# Patient Record
Sex: MALE | Race: WHITE | URBAN - METROPOLITAN AREA
[De-identification: names, ages, dates, MRNs, and addresses within clinical notes are randomized per-mention and may not be internally consistent; named-entity substitution may affect disease eponyms.]

---

## 2017-01-01 ENCOUNTER — TELEPHONE (OUTPATIENT)
Dept: FAMILY MEDICINE CLINIC | Facility: CLINIC | Age: 52
End: 2017-01-01

## 2017-07-17 RX ORDER — HYDROCHLOROTHIAZIDE 12.5 MG/1
CAPSULE, GELATIN COATED ORAL
Qty: 90 CAPSULE | Refills: 0 | Status: SHIPPED | OUTPATIENT
Start: 2017-07-17 | End: 2017-10-13

## 2017-07-17 RX ORDER — HYDROCHLOROTHIAZIDE 12.5 MG/1
1 CAPSULE, GELATIN COATED ORAL DAILY
COMMUNITY
Start: 2014-09-11 | End: 2017-09-21

## 2017-09-21 ENCOUNTER — OFFICE VISIT (OUTPATIENT)
Dept: FAMILY MEDICINE CLINIC | Facility: CLINIC | Age: 52
End: 2017-09-21

## 2017-09-21 ENCOUNTER — APPOINTMENT (OUTPATIENT)
Dept: LAB | Age: 52
End: 2017-09-21
Attending: FAMILY MEDICINE
Payer: COMMERCIAL

## 2017-09-21 VITALS
RESPIRATION RATE: 18 BRPM | WEIGHT: 271.38 LBS | HEART RATE: 70 BPM | TEMPERATURE: 97 F | SYSTOLIC BLOOD PRESSURE: 138 MMHG | BODY MASS INDEX: 34.83 KG/M2 | HEIGHT: 74 IN | DIASTOLIC BLOOD PRESSURE: 94 MMHG

## 2017-09-21 DIAGNOSIS — R97.20 ELEVATED PROSTATE SPECIFIC ANTIGEN (PSA): ICD-10-CM

## 2017-09-21 DIAGNOSIS — Z00.00 HEALTH CARE MAINTENANCE: ICD-10-CM

## 2017-09-21 DIAGNOSIS — M25.50 ARTHRALGIA, UNSPECIFIED JOINT: ICD-10-CM

## 2017-09-21 DIAGNOSIS — I10 ESSENTIAL HYPERTENSION: Primary | ICD-10-CM

## 2017-09-21 LAB
ALBUMIN SERPL-MCNC: 3.9 G/DL (ref 3.5–4.8)
ALP LIVER SERPL-CCNC: 92 U/L (ref 45–117)
ALT SERPL-CCNC: 41 U/L (ref 17–63)
AST SERPL-CCNC: 18 U/L (ref 15–41)
BASOPHILS # BLD AUTO: 0.04 X10(3) UL (ref 0–0.1)
BASOPHILS NFR BLD AUTO: 0.7 %
BILIRUB SERPL-MCNC: 0.6 MG/DL (ref 0.1–2)
BILIRUB UR QL STRIP.AUTO: NEGATIVE
BUN BLD-MCNC: 24 MG/DL (ref 8–20)
CALCIUM BLD-MCNC: 9.2 MG/DL (ref 8.3–10.3)
CHLORIDE: 108 MMOL/L (ref 101–111)
CHOLEST SMN-MCNC: 168 MG/DL (ref ?–200)
CO2: 24 MMOL/L (ref 22–32)
COLOR UR AUTO: YELLOW
CREAT BLD-MCNC: 0.99 MG/DL (ref 0.7–1.3)
EOSINOPHIL # BLD AUTO: 0.19 X10(3) UL (ref 0–0.3)
EOSINOPHIL NFR BLD AUTO: 3.1 %
ERYTHROCYTE [DISTWIDTH] IN BLOOD BY AUTOMATED COUNT: 13.5 % (ref 11.5–16)
GLUCOSE BLD-MCNC: 91 MG/DL (ref 70–99)
GLUCOSE UR STRIP.AUTO-MCNC: NEGATIVE MG/DL
HCT VFR BLD AUTO: 45 % (ref 37–53)
HDLC SERPL-MCNC: 50 MG/DL (ref 45–?)
HDLC SERPL: 3.36 {RATIO} (ref ?–4.97)
HGB BLD-MCNC: 15 G/DL (ref 13–17)
IMMATURE GRANULOCYTE COUNT: 0.02 X10(3) UL (ref 0–1)
IMMATURE GRANULOCYTE RATIO %: 0.3 %
KETONES UR STRIP.AUTO-MCNC: NEGATIVE MG/DL
LDLC SERPL CALC-MCNC: 100 MG/DL (ref ?–130)
LDLC SERPL-MCNC: 18 MG/DL (ref 5–40)
LEUKOCYTE ESTERASE UR QL STRIP.AUTO: NEGATIVE
LYMPHOCYTES # BLD AUTO: 1.51 X10(3) UL (ref 0.9–4)
LYMPHOCYTES NFR BLD AUTO: 24.6 %
M PROTEIN MFR SERPL ELPH: 7.6 G/DL (ref 6.1–8.3)
MCH RBC QN AUTO: 29.1 PG (ref 27–33.2)
MCHC RBC AUTO-ENTMCNC: 33.3 G/DL (ref 31–37)
MCV RBC AUTO: 87.2 FL (ref 80–99)
MONOCYTES # BLD AUTO: 0.51 X10(3) UL (ref 0.1–0.6)
MONOCYTES NFR BLD AUTO: 8.3 %
NEUTROPHIL ABS PRELIM: 3.88 X10 (3) UL (ref 1.3–6.7)
NEUTROPHILS # BLD AUTO: 3.88 X10(3) UL (ref 1.3–6.7)
NEUTROPHILS NFR BLD AUTO: 63 %
NITRITE UR QL STRIP.AUTO: NEGATIVE
NONHDLC SERPL-MCNC: 118 MG/DL (ref ?–130)
PH UR STRIP.AUTO: 5 [PH] (ref 4.5–8)
PLATELET # BLD AUTO: 220 10(3)UL (ref 150–450)
POTASSIUM SERPL-SCNC: 4 MMOL/L (ref 3.6–5.1)
PROT UR STRIP.AUTO-MCNC: NEGATIVE MG/DL
PSA SERPL-MCNC: 3.14 NG/ML (ref 0.01–4)
RBC # BLD AUTO: 5.16 X10(6)UL (ref 4.3–5.7)
RBC UR QL AUTO: NEGATIVE
RED CELL DISTRIBUTION WIDTH-SD: 43 FL (ref 35.1–46.3)
SODIUM SERPL-SCNC: 141 MMOL/L (ref 136–144)
SP GR UR STRIP.AUTO: 1.02 (ref 1–1.03)
TRIGLYCERIDES: 89 MG/DL (ref ?–150)
TSI SER-ACNC: 2.27 MIU/ML (ref 0.35–5.5)
UROBILINOGEN UR STRIP.AUTO-MCNC: <2 MG/DL
WBC # BLD AUTO: 6.2 X10(3) UL (ref 4–13)

## 2017-09-21 PROCEDURE — 81003 URINALYSIS AUTO W/O SCOPE: CPT | Performed by: FAMILY MEDICINE

## 2017-09-21 PROCEDURE — 87476 LYME DIS DNA AMP PROBE: CPT | Performed by: FAMILY MEDICINE

## 2017-09-21 PROCEDURE — 84153 ASSAY OF PSA TOTAL: CPT | Performed by: FAMILY MEDICINE

## 2017-09-21 PROCEDURE — 36415 COLL VENOUS BLD VENIPUNCTURE: CPT | Performed by: FAMILY MEDICINE

## 2017-09-21 PROCEDURE — 99213 OFFICE O/P EST LOW 20 MIN: CPT | Performed by: FAMILY MEDICINE

## 2017-09-21 PROCEDURE — 80061 LIPID PANEL: CPT | Performed by: FAMILY MEDICINE

## 2017-09-21 PROCEDURE — 80050 GENERAL HEALTH PANEL: CPT | Performed by: FAMILY MEDICINE

## 2017-09-21 RX ORDER — HYDROCODONE BITARTRATE AND ACETAMINOPHEN 5; 300 MG/1; MG/1
1 TABLET ORAL AS NEEDED
Refills: 0 | COMMUNITY
Start: 2017-07-24

## 2017-09-21 RX ORDER — CYCLOBENZAPRINE HCL 10 MG
10 TABLET ORAL 3 TIMES DAILY PRN
COMMUNITY
End: 2018-01-01

## 2017-09-21 RX ORDER — OMEPRAZOLE 20 MG/1
20 CAPSULE, DELAYED RELEASE ORAL
COMMUNITY
End: 2018-01-01

## 2017-09-21 RX ORDER — IBUPROFEN 600 MG/1
600 TABLET ORAL EVERY 6 HOURS PRN
COMMUNITY

## 2017-09-21 RX ORDER — LOSARTAN POTASSIUM 50 MG/1
50 TABLET ORAL DAILY
Qty: 30 TABLET | Refills: 0 | Status: SHIPPED | OUTPATIENT
Start: 2017-09-21 | End: 2017-10-13

## 2017-09-21 NOTE — PROGRESS NOTES
[address]  PROGRESS NOTE  Chief Complaint:   Patient presents with: Follow - Up: B/P      HPI:   This is a 46year old male coming in for recheck of blood pressure. Over the past month patient has noted his blood pressure be elevated. by mouth every morning before breakfast. Disp:  Rfl:    ibuprofen 600 MG Oral Tab Take 600 mg by mouth every 6 (six) hours as needed for Pain. Disp:  Rfl:    Losartan Potassium 50 MG Oral Tab Take 1 tablet (50 mg total) by mouth daily.  Disp: 30 tablet Rfl: LYME DISEASE(B.BURGDORFERI)PCR; Future  -     LYME DISEASE(B.BURGDORFERI)PCR    Elevated prostate specific antigen (PSA)  -     PSA; Future  -     PSA    Health care maintenance  -     CBC WITH DIFFERENTIAL WITH PLATELET;  Future  -     COMP METABOLIC PAN

## 2017-09-21 NOTE — PATIENT INSTRUCTIONS
Continue hydrochlorothiazide. Start to new blood pressure medication, losartan. Return for physical exam.  Labs drawn today.

## 2017-09-25 ENCOUNTER — TELEPHONE (OUTPATIENT)
Dept: FAMILY MEDICINE CLINIC | Facility: CLINIC | Age: 52
End: 2017-09-25

## 2017-09-25 LAB — BORRELIA SPECIES DNA DETECTION: NOT DETECTED

## 2017-09-25 NOTE — TELEPHONE ENCOUNTER
Informed pt of his blood work results. Pleased results of blood work up front for  along with last years blood work. Pt expressed understanding and thanks.

## 2017-09-25 NOTE — TELEPHONE ENCOUNTER
----- Message from Remigio Reina MD sent at 9/25/2017  8:28 AM CDT -----  Labs are completely normal including PSA of 3.1 (has a history of elevated PSA) and Lyme disease test which is negative.   Please notify patient

## 2017-10-13 ENCOUNTER — OFFICE VISIT (OUTPATIENT)
Dept: FAMILY MEDICINE CLINIC | Facility: CLINIC | Age: 52
End: 2017-10-13

## 2017-10-13 VITALS
DIASTOLIC BLOOD PRESSURE: 88 MMHG | TEMPERATURE: 98 F | HEIGHT: 74.5 IN | WEIGHT: 274.13 LBS | SYSTOLIC BLOOD PRESSURE: 140 MMHG | HEART RATE: 80 BPM | BODY MASS INDEX: 34.81 KG/M2

## 2017-10-13 DIAGNOSIS — Z00.00 HEALTH CARE MAINTENANCE: Primary | ICD-10-CM

## 2017-10-13 DIAGNOSIS — I10 ESSENTIAL HYPERTENSION: ICD-10-CM

## 2017-10-13 PROCEDURE — 99396 PREV VISIT EST AGE 40-64: CPT | Performed by: FAMILY MEDICINE

## 2017-10-13 RX ORDER — HYDROCHLOROTHIAZIDE 12.5 MG/1
CAPSULE, GELATIN COATED ORAL
Qty: 90 CAPSULE | Refills: 3 | Status: SHIPPED | OUTPATIENT
Start: 2017-10-13 | End: 2018-01-01

## 2017-10-13 RX ORDER — LOSARTAN POTASSIUM 100 MG/1
100 TABLET ORAL DAILY
Qty: 90 TABLET | Refills: 3 | Status: SHIPPED | OUTPATIENT
Start: 2017-10-13 | End: 2018-01-01

## 2017-10-13 NOTE — PATIENT INSTRUCTIONS
Increase losartan to 100 mg daily. Continue with hydrochlorothiazide. Follow blood pressures at home and to report readings in 3 weeks. Continue to chronic low back and try to increase exercise and work on weight loss.   Recommend colonoscopy for colon c

## 2017-10-13 NOTE — PROGRESS NOTES
[facility] [place]  PROGRESS NOTE  Chief Complaint:   Patient presents with:  Blood Pressure      HPI:   This is a 46year old male coming in for general wellness check and recheck of hypertension.   3 weeks ago the patient was started on losarta Cloudy (A) Clear   Spec Gravity 1.025 1.001 - 1.030   Glucose Urine Negative Negative mg/dl   Bilirubin Urine Negative Negative   Ketones Urine Negative Negative mg/dL   Blood Urine Negative Negative   pH Urine 5.0 4.5 - 8.0   Protein Urine Negative Negati Mother    • CAD [OTHER] Father      CAD and prostate cancer.   MI at age 43, throat cancer, non-smoker   • No Known Problems Sister    • No Known Problems Brother      Allergies:    Demerol                     Comment:Other reaction(s): nausea  Enalapril Ma bleeding or bruising. LYMPHATICS:  Denies enlarged nodes or history of splenectomy. PSYCHIATRIC:  Denies depression or anxiety.       EXAM:   /88   Pulse 80   Temp 97.7 °F (36.5 °C) (Tympanic)   Ht 74.5\"   Wt 274 lb 2 oz   BMI 34.72 kg/m²  Estimate vaccine within the past 5 years. Recommend colonoscopy for colon cancer screening. Recommend weight loss and exercise.       Health Maintenance:  Annual Depression Screen due on 09/30/1965  Annual Physical due on 09/30/1967  FIT Colorectal Screening due o

## 2017-12-18 NOTE — TELEPHONE ENCOUNTER
Patient called and stated that he has only been getting 30 day supply of Losartan and he would like a 90 day supply. I contacted [address] [place] [address] in [facility] they stated that he only got 30 days supply due to insurance not convering the 90 day supply.  I ca

## 2018-01-01 ENCOUNTER — TELEPHONE (OUTPATIENT)
Dept: FAMILY MEDICINE CLINIC | Facility: CLINIC | Age: 53
End: 2018-01-01

## 2018-01-01 ENCOUNTER — APPOINTMENT (OUTPATIENT)
Dept: LAB | Age: 53
End: 2018-01-01
Attending: FAMILY MEDICINE
Payer: COMMERCIAL

## 2018-01-01 ENCOUNTER — OFFICE VISIT (OUTPATIENT)
Dept: FAMILY MEDICINE CLINIC | Facility: CLINIC | Age: 53
End: 2018-01-01
Payer: COMMERCIAL

## 2018-01-01 VITALS
BODY MASS INDEX: 32.98 KG/M2 | TEMPERATURE: 97 F | HEIGHT: 74 IN | WEIGHT: 257 LBS | DIASTOLIC BLOOD PRESSURE: 90 MMHG | HEART RATE: 76 BPM | SYSTOLIC BLOOD PRESSURE: 160 MMHG

## 2018-01-01 DIAGNOSIS — Z00.00 HEALTH CARE MAINTENANCE: Primary | ICD-10-CM

## 2018-01-01 DIAGNOSIS — I10 ESSENTIAL HYPERTENSION: ICD-10-CM

## 2018-01-01 PROCEDURE — 36415 COLL VENOUS BLD VENIPUNCTURE: CPT | Performed by: FAMILY MEDICINE

## 2018-01-01 PROCEDURE — 99396 PREV VISIT EST AGE 40-64: CPT | Performed by: FAMILY MEDICINE

## 2018-01-01 PROCEDURE — 80050 GENERAL HEALTH PANEL: CPT | Performed by: FAMILY MEDICINE

## 2018-01-01 PROCEDURE — 84153 ASSAY OF PSA TOTAL: CPT | Performed by: FAMILY MEDICINE

## 2018-01-01 PROCEDURE — 81003 URINALYSIS AUTO W/O SCOPE: CPT | Performed by: FAMILY MEDICINE

## 2018-01-01 PROCEDURE — 80061 LIPID PANEL: CPT | Performed by: FAMILY MEDICINE

## 2018-01-01 RX ORDER — LOSARTAN POTASSIUM 100 MG/1
TABLET ORAL
Qty: 90 TABLET | Refills: 0 | Status: SHIPPED | OUTPATIENT
Start: 2018-01-01

## 2018-01-01 RX ORDER — AMLODIPINE BESYLATE 2.5 MG/1
2.5 TABLET ORAL DAILY
Qty: 30 TABLET | Refills: 0 | Status: SHIPPED | OUTPATIENT
Start: 2018-01-01 | End: 2018-01-01

## 2018-01-01 RX ORDER — AMLODIPINE BESYLATE 5 MG/1
5 TABLET ORAL DAILY
COMMUNITY

## 2018-01-01 RX ORDER — AMLODIPINE BESYLATE 5 MG/1
5 TABLET ORAL DAILY
Qty: 90 TABLET | Refills: 0 | Status: SHIPPED | OUTPATIENT
Start: 2018-01-01

## 2018-01-01 RX ORDER — LOSARTAN POTASSIUM 100 MG/1
100 TABLET ORAL DAILY
Qty: 90 TABLET | Refills: 2 | Status: SHIPPED | OUTPATIENT
Start: 2018-01-01 | End: 2018-01-01

## 2018-01-01 RX ORDER — HYDROCHLOROTHIAZIDE 12.5 MG/1
CAPSULE, GELATIN COATED ORAL
Qty: 90 CAPSULE | Refills: 2 | Status: SHIPPED | OUTPATIENT
Start: 2018-01-01 | End: 2018-01-01

## 2018-01-19 NOTE — TELEPHONE ENCOUNTER
Future appt:    Last Appointment:  10/13/2017    Cholesterol, Total (mg/dL)   Date Value   09/21/2017 168   ----------  HDL Cholesterol (mg/dL)   Date Value   09/21/2017 50   ----------  LDL Cholesterol (mg/dL)   Date Value   09/21/2017 100   ----------  T

## 2018-10-25 NOTE — PATIENT INSTRUCTIONS
Continue with losartan 100 mg daily. Begin amlodipine 2.5 mg daily. Call blood pressure readings in 3 weeks. Recommend colonoscopy for colon cancer screening.

## 2018-10-25 NOTE — PROGRESS NOTES
[facility] [place]  PROGRESS NOTE  Chief Complaint:   Patient presents with:  Physical      HPI:   This is a 48year old male coming in for general wellness and recheck of problems. History of hypertension and taking losartan 100 mg daily.   Was Protein Urine Negative Negative mg/dl    Urobilinogen Urine <2.0 0.2 - 2.0 mg/dL    Nitrite Urine Negative Negative    Leukocyte Esterase Urine Negative Negative    Microscopic Microscopic not indicated    CBC W/ DIFFERENTIAL   Result Value Ref Range    WB Comment:Other reaction(s): nausea  Enalapril Maleate           Comment:Other reaction(s): cough  Current Meds:    Current Outpatient Medications: AmLODIPine Besylate 2.5 MG Oral Tab Take 1 tablet (2.5 mg total) by mouth daily.  Disp: 30 tablet Rfl this encounter: 257 lb. Vital signs reviewed. Appears stated age, well groomed  Physical Exam:  GEN:  Patient is alert, awake and oriented, well developed, well nourished, no apparent distress.   HEENT:  Head:  Normocephalic, atraumatic Eyes: EOMI, PERRLA, 09/30/1965  FIT Colorectal Screening due on 09/30/2015  Annual Depression Screen due on 10/13/2018  Annual Physical due on 10/13/2018  Influenza Vaccine(1) due on 09/01/2018    Patient/Caregiver Education: Patient/Caregiver Education: There are no barriers

## 2018-10-26 NOTE — TELEPHONE ENCOUNTER
Informed pt of his blood work results. Placed copies up front for . Pt expressed understanding and thanks.

## 2018-10-26 NOTE — TELEPHONE ENCOUNTER
----- Message from Jose Whaley MD sent at 10/26/2018  6:48 AM CDT -----  Labs are excellent.   All are normal.  Please notify patient

## 2018-11-01 NOTE — TELEPHONE ENCOUNTER
dr changed his blood pressure medication, took him off of one and put on another his blood pressure is 137/87

## 2018-11-01 NOTE — TELEPHONE ENCOUNTER
Blood pressures improved but I would like to have a slightly lower than 137/87 average. Patient is on amlodipine 2.5 mg daily (new medication) and taking losartan 100 mg daily.   I would like patient to continue to check blood pressures at home and call ba

## 2018-11-01 NOTE — TELEPHONE ENCOUNTER
Pt called with an update of his BP. Since starting the Norvasc his BP is averaging about 137/87. Pt feels good on this med.

## 2018-11-02 NOTE — TELEPHONE ENCOUNTER
Informed pt of the recommendations. Pt will monitor his BP and bring in more reading. Informed pt of his BP goal below 135/85. Pt is aware that in the future Dr. Adolfo Parra may increase the norvasc dose. Pt expressed understanding and thanks.

## 2018-11-20 NOTE — TELEPHONE ENCOUNTER
Please advise if B/P Medication is to be increased due to Blood Pressure Readings.   Maribel Sebastian, 11/20/18, 9:22 AM      Future appt:    Last Appointment:  10/25/2018  Cholesterol, Total (mg/dL)   Date Value   10/25/2018 157     HDL Cholesterol (mg/dL)   Da

## 2018-11-20 NOTE — TELEPHONE ENCOUNTER
RF BP meds  1) lisinopril      todays reading was 140/88 (   DR may want to increase strength ? )        to   jasvir in [address]   call back to confirm this completed

## 2018-11-21 NOTE — TELEPHONE ENCOUNTER
Future appt:    Last Appointment:  10/25/2018  Cholesterol, Total (mg/dL)   Date Value   10/25/2018 157     HDL Cholesterol (mg/dL)   Date Value   10/25/2018 50     LDL Cholesterol (mg/dL)   Date Value   10/25/2018 94     Triglycerides (mg/dL)   Date Value

## 2018-11-21 NOTE — TELEPHONE ENCOUNTER
Continue with losartan 100 mg daily, but increase amlodipine from 2.5 mg daily up to 5 mg daily. May send prescription for 5 mg into the pharmacy of choice. Call in 3 weeks for update on blood pressure.

## 2018-12-10 ENCOUNTER — TELEPHONE (OUTPATIENT)
Dept: FAMILY MEDICINE CLINIC | Facility: CLINIC | Age: 53
End: 2018-12-10

## 2018-12-26 ENCOUNTER — TELEPHONE (OUTPATIENT)
Dept: FAMILY MEDICINE CLINIC | Facility: CLINIC | Age: 53
End: 2018-12-26

## 2018-12-26 NOTE — TELEPHONE ENCOUNTER
Patients wife needs papers filled out for life insurance, patient past away last month and insurance is asking for more medical information filled out by doctor. Patients wife will fax papers today, call when those are ready for .  Patients doctor wa

## 2018-12-26 NOTE — TELEPHONE ENCOUNTER
Wife states that  passed away on 11/26/18  Wife states form is very brief -paperwork for car insurance not for life insurance.   Wife hesitated to schedule any appt, asked that Dr. Nuris Oneil look at form first.

## 2018-12-26 NOTE — TELEPHONE ENCOUNTER
I need that certificate of patient to fill out form. I do not have any information available in the chart.

## 2020-02-28 ENCOUNTER — TELEPHONE (OUTPATIENT)
Dept: FAMILY MEDICINE CLINIC | Facility: CLINIC | Age: 55
End: 2020-02-28

## 2020-02-28 NOTE — TELEPHONE ENCOUNTER
PT'S WIFE SUBMITTED AN AUTHORIZATION TO HAVE A COPY PT'S ENTIRE MEDICAL CHART. THIS WAS SENT TO [id number].